# Patient Record
Sex: FEMALE | Employment: UNEMPLOYED | URBAN - METROPOLITAN AREA
[De-identification: names, ages, dates, MRNs, and addresses within clinical notes are randomized per-mention and may not be internally consistent; named-entity substitution may affect disease eponyms.]

---

## 2019-03-01 ASSESSMENT — MIFFLIN-ST. JEOR: SCORE: 1365.35

## 2019-03-05 ENCOUNTER — ANESTHESIA EVENT (OUTPATIENT)
Dept: SURGERY | Facility: AMBULATORY SURGERY CENTER | Age: 39
End: 2019-03-05

## 2019-03-06 ENCOUNTER — ANESTHESIA (OUTPATIENT)
Dept: SURGERY | Facility: AMBULATORY SURGERY CENTER | Age: 39
End: 2019-03-06

## 2019-03-06 ENCOUNTER — HOSPITAL ENCOUNTER (OUTPATIENT)
Facility: AMBULATORY SURGERY CENTER | Age: 39
Discharge: HOME | End: 2019-03-06
Attending: PLASTIC SURGERY | Admitting: PLASTIC SURGERY

## 2019-03-06 VITALS
TEMPERATURE: 97.6 F | OXYGEN SATURATION: 95 % | BODY MASS INDEX: 22.76 KG/M2 | SYSTOLIC BLOOD PRESSURE: 118 MMHG | HEIGHT: 67 IN | WEIGHT: 145 LBS | DIASTOLIC BLOOD PRESSURE: 81 MMHG | RESPIRATION RATE: 21 BRPM | HEART RATE: 66 BPM

## 2019-03-06 DIAGNOSIS — R11.0 NAUSEA AFTER ANESTHESIA, INITIAL ENCOUNTER: ICD-10-CM

## 2019-03-06 DIAGNOSIS — B99.9 INFECTION: ICD-10-CM

## 2019-03-06 DIAGNOSIS — T88.59XA NAUSEA AFTER ANESTHESIA, INITIAL ENCOUNTER: ICD-10-CM

## 2019-03-06 DIAGNOSIS — R52 PAIN: Primary | ICD-10-CM

## 2019-03-06 LAB — HCG UR QL: NEGATIVE

## 2019-03-06 PROCEDURE — 81025 URINE PREGNANCY TEST: CPT | Performed by: ANESTHESIOLOGY

## 2019-03-06 PROCEDURE — G8907 PT DOC NO EVENTS ON DISCHARG: HCPCS

## 2019-03-06 PROCEDURE — G8916 PT W IV AB GIVEN ON TIME: HCPCS

## 2019-03-06 PROCEDURE — L8600 IMPLANT BREAST SILICONE/EQ: HCPCS | Mod: 50

## 2019-03-06 PROCEDURE — 36000140 ZZH SURGERY LEVEL COSMETIC 120 MIN

## 2019-03-06 DEVICE — IMPLANTABLE DEVICE: Type: IMPLANTABLE DEVICE | Site: BREAST | Status: FUNCTIONAL

## 2019-03-06 RX ORDER — FENTANYL CITRATE 50 UG/ML
25-50 INJECTION, SOLUTION INTRAMUSCULAR; INTRAVENOUS
Status: DISCONTINUED | OUTPATIENT
Start: 2019-03-06 | End: 2019-03-07 | Stop reason: HOSPADM

## 2019-03-06 RX ORDER — DIAZEPAM 10 MG
10 TABLET ORAL EVERY 12 HOURS PRN
Status: DISCONTINUED
Start: 2019-03-06 | End: 2019-03-07 | Stop reason: HOSPADM

## 2019-03-06 RX ORDER — ONDANSETRON 2 MG/ML
INJECTION INTRAMUSCULAR; INTRAVENOUS PRN
Status: DISCONTINUED | OUTPATIENT
Start: 2019-03-06 | End: 2019-03-06

## 2019-03-06 RX ORDER — LIDOCAINE HYDROCHLORIDE 20 MG/ML
INJECTION, SOLUTION INFILTRATION; PERINEURAL PRN
Status: DISCONTINUED | OUTPATIENT
Start: 2019-03-06 | End: 2019-03-06

## 2019-03-06 RX ORDER — CEFAZOLIN SODIUM 2 G/100ML
2 INJECTION, SOLUTION INTRAVENOUS
Status: COMPLETED | OUTPATIENT
Start: 2019-03-06 | End: 2019-03-06

## 2019-03-06 RX ORDER — OXYCODONE AND ACETAMINOPHEN 5; 325 MG/1; MG/1
2 TABLET ORAL
Status: DISCONTINUED | OUTPATIENT
Start: 2019-03-06 | End: 2019-03-07 | Stop reason: HOSPADM

## 2019-03-06 RX ORDER — NALOXONE HYDROCHLORIDE 0.4 MG/ML
.1-.4 INJECTION, SOLUTION INTRAMUSCULAR; INTRAVENOUS; SUBCUTANEOUS
Status: DISCONTINUED | OUTPATIENT
Start: 2019-03-06 | End: 2019-03-07 | Stop reason: HOSPADM

## 2019-03-06 RX ORDER — ONDANSETRON 4 MG/1
8 TABLET, ORALLY DISINTEGRATING ORAL EVERY 8 HOURS PRN
Qty: 4 TABLET | Refills: 0 | Status: ON HOLD
Start: 2019-03-06 | End: 2020-07-04

## 2019-03-06 RX ORDER — HYDROMORPHONE HYDROCHLORIDE 1 MG/ML
.3-.5 INJECTION, SOLUTION INTRAMUSCULAR; INTRAVENOUS; SUBCUTANEOUS EVERY 10 MIN PRN
Status: DISCONTINUED | OUTPATIENT
Start: 2019-03-06 | End: 2019-03-07 | Stop reason: HOSPADM

## 2019-03-06 RX ORDER — GABAPENTIN 300 MG/1
300 CAPSULE ORAL ONCE
Status: COMPLETED | OUTPATIENT
Start: 2019-03-06 | End: 2019-03-06

## 2019-03-06 RX ORDER — TRIAMCINOLONE ACETONIDE 40 MG/ML
INJECTION, SUSPENSION INTRA-ARTICULAR; INTRAMUSCULAR PRN
Status: DISCONTINUED | OUTPATIENT
Start: 2019-03-06 | End: 2019-03-06 | Stop reason: HOSPADM

## 2019-03-06 RX ORDER — CHLORAL HYDRATE 500 MG
2 CAPSULE ORAL DAILY
COMMUNITY

## 2019-03-06 RX ORDER — PROPOFOL 10 MG/ML
INJECTION, EMULSION INTRAVENOUS PRN
Status: DISCONTINUED | OUTPATIENT
Start: 2019-03-06 | End: 2019-03-06

## 2019-03-06 RX ORDER — ALBUTEROL SULFATE 0.83 MG/ML
2.5 SOLUTION RESPIRATORY (INHALATION) EVERY 4 HOURS PRN
Status: DISCONTINUED | OUTPATIENT
Start: 2019-03-06 | End: 2019-03-07 | Stop reason: HOSPADM

## 2019-03-06 RX ORDER — MEPERIDINE HYDROCHLORIDE 25 MG/ML
12.5 INJECTION INTRAMUSCULAR; INTRAVENOUS; SUBCUTANEOUS
Status: DISCONTINUED | OUTPATIENT
Start: 2019-03-06 | End: 2019-03-07 | Stop reason: HOSPADM

## 2019-03-06 RX ORDER — ACETAMINOPHEN 325 MG/1
975 TABLET ORAL ONCE
Status: COMPLETED | OUTPATIENT
Start: 2019-03-06 | End: 2019-03-06

## 2019-03-06 RX ORDER — FENTANYL CITRATE 50 UG/ML
INJECTION, SOLUTION INTRAMUSCULAR; INTRAVENOUS PRN
Status: DISCONTINUED | OUTPATIENT
Start: 2019-03-06 | End: 2019-03-06

## 2019-03-06 RX ORDER — BUPIVACAINE HYDROCHLORIDE AND EPINEPHRINE 2.5; 5 MG/ML; UG/ML
INJECTION, SOLUTION INFILTRATION; PERINEURAL PRN
Status: DISCONTINUED | OUTPATIENT
Start: 2019-03-06 | End: 2019-03-06 | Stop reason: HOSPADM

## 2019-03-06 RX ORDER — MULTIPLE VITAMINS W/ MINERALS TAB 9MG-400MCG
1 TAB ORAL DAILY
Status: ON HOLD | COMMUNITY
End: 2020-07-04

## 2019-03-06 RX ORDER — CEPHALEXIN 500 MG/1
500 CAPSULE ORAL 2 TIMES DAILY
Qty: 28 CAPSULE | Refills: 0
Start: 2019-03-06 | End: 2019-03-13

## 2019-03-06 RX ORDER — DEXAMETHASONE SODIUM PHOSPHATE 4 MG/ML
10 INJECTION, SOLUTION INTRA-ARTICULAR; INTRALESIONAL; INTRAMUSCULAR; INTRAVENOUS; SOFT TISSUE ONCE
Status: COMPLETED | OUTPATIENT
Start: 2019-03-06 | End: 2019-03-06

## 2019-03-06 RX ORDER — ONDANSETRON 2 MG/ML
4 INJECTION INTRAMUSCULAR; INTRAVENOUS EVERY 30 MIN PRN
Status: DISCONTINUED | OUTPATIENT
Start: 2019-03-06 | End: 2019-03-07 | Stop reason: HOSPADM

## 2019-03-06 RX ORDER — NEOSTIGMINE METHYLSULFATE 1 MG/ML
VIAL (ML) INJECTION PRN
Status: DISCONTINUED | OUTPATIENT
Start: 2019-03-06 | End: 2019-03-06

## 2019-03-06 RX ORDER — ONDANSETRON 4 MG/1
4 TABLET, ORALLY DISINTEGRATING ORAL
Status: DISCONTINUED | OUTPATIENT
Start: 2019-03-06 | End: 2019-03-07 | Stop reason: HOSPADM

## 2019-03-06 RX ORDER — SODIUM CHLORIDE, SODIUM LACTATE, POTASSIUM CHLORIDE, CALCIUM CHLORIDE 600; 310; 30; 20 MG/100ML; MG/100ML; MG/100ML; MG/100ML
INJECTION, SOLUTION INTRAVENOUS CONTINUOUS
Status: DISCONTINUED | OUTPATIENT
Start: 2019-03-06 | End: 2019-03-07 | Stop reason: HOSPADM

## 2019-03-06 RX ORDER — GLYCOPYRROLATE 0.2 MG/ML
INJECTION, SOLUTION INTRAMUSCULAR; INTRAVENOUS PRN
Status: DISCONTINUED | OUTPATIENT
Start: 2019-03-06 | End: 2019-03-06

## 2019-03-06 RX ORDER — OXYCODONE AND ACETAMINOPHEN 5; 325 MG/1; MG/1
1-2 TABLET ORAL EVERY 4 HOURS PRN
Qty: 20 TABLET | Refills: 0 | Status: ON HOLD
Start: 2019-03-06 | End: 2020-07-04

## 2019-03-06 RX ORDER — PROPOFOL 10 MG/ML
INJECTION, EMULSION INTRAVENOUS CONTINUOUS PRN
Status: DISCONTINUED | OUTPATIENT
Start: 2019-03-06 | End: 2019-03-06

## 2019-03-06 RX ORDER — ONDANSETRON 4 MG/1
4 TABLET, ORALLY DISINTEGRATING ORAL EVERY 30 MIN PRN
Status: DISCONTINUED | OUTPATIENT
Start: 2019-03-06 | End: 2019-03-07 | Stop reason: HOSPADM

## 2019-03-06 RX ORDER — KETOROLAC TROMETHAMINE 30 MG/ML
30 INJECTION, SOLUTION INTRAMUSCULAR; INTRAVENOUS EVERY 6 HOURS PRN
Status: DISCONTINUED | OUTPATIENT
Start: 2019-03-06 | End: 2019-03-07 | Stop reason: HOSPADM

## 2019-03-06 RX ORDER — OXYCODONE HYDROCHLORIDE 5 MG/1
5-10 TABLET ORAL EVERY 4 HOURS PRN
Status: DISCONTINUED | OUTPATIENT
Start: 2019-03-06 | End: 2019-03-07 | Stop reason: HOSPADM

## 2019-03-06 RX ORDER — DEXAMETHASONE SODIUM PHOSPHATE 4 MG/ML
4 INJECTION, SOLUTION INTRA-ARTICULAR; INTRALESIONAL; INTRAMUSCULAR; INTRAVENOUS; SOFT TISSUE EVERY 10 MIN PRN
Status: DISCONTINUED | OUTPATIENT
Start: 2019-03-06 | End: 2019-03-07 | Stop reason: HOSPADM

## 2019-03-06 RX ORDER — HYDROXYZINE PAMOATE 25 MG/1
25 CAPSULE ORAL EVERY 6 HOURS PRN
Qty: 30 CAPSULE | Refills: 0 | Status: ON HOLD
Start: 2019-03-06 | End: 2020-07-04

## 2019-03-06 RX ORDER — HYDROXYZINE HYDROCHLORIDE 25 MG/1
25 TABLET, FILM COATED ORAL
Status: DISCONTINUED | OUTPATIENT
Start: 2019-03-06 | End: 2019-03-07 | Stop reason: HOSPADM

## 2019-03-06 RX ADMIN — FENTANYL CITRATE 25 MCG: 50 INJECTION, SOLUTION INTRAMUSCULAR; INTRAVENOUS at 11:00

## 2019-03-06 RX ADMIN — DEXAMETHASONE SODIUM PHOSPHATE 10 MG: 4 INJECTION, SOLUTION INTRA-ARTICULAR; INTRALESIONAL; INTRAMUSCULAR; INTRAVENOUS; SOFT TISSUE at 09:19

## 2019-03-06 RX ADMIN — Medication 3 MG: at 10:08

## 2019-03-06 RX ADMIN — PROPOFOL 200 MG: 10 INJECTION, EMULSION INTRAVENOUS at 09:12

## 2019-03-06 RX ADMIN — FENTANYL CITRATE 50 MCG: 50 INJECTION, SOLUTION INTRAMUSCULAR; INTRAVENOUS at 09:12

## 2019-03-06 RX ADMIN — Medication 20 MG: at 09:30

## 2019-03-06 RX ADMIN — FENTANYL CITRATE 50 MCG: 50 INJECTION, SOLUTION INTRAMUSCULAR; INTRAVENOUS at 09:24

## 2019-03-06 RX ADMIN — CEFAZOLIN SODIUM 2 G: 2 INJECTION, SOLUTION INTRAVENOUS at 09:08

## 2019-03-06 RX ADMIN — FENTANYL CITRATE 50 MCG: 50 INJECTION, SOLUTION INTRAMUSCULAR; INTRAVENOUS at 10:24

## 2019-03-06 RX ADMIN — ACETAMINOPHEN 975 MG: 325 TABLET ORAL at 08:28

## 2019-03-06 RX ADMIN — GABAPENTIN 300 MG: 300 CAPSULE ORAL at 08:28

## 2019-03-06 RX ADMIN — SODIUM CHLORIDE, SODIUM LACTATE, POTASSIUM CHLORIDE, CALCIUM CHLORIDE: 600; 310; 30; 20 INJECTION, SOLUTION INTRAVENOUS at 08:37

## 2019-03-06 RX ADMIN — GLYCOPYRROLATE 0.4 MG: 0.2 INJECTION, SOLUTION INTRAMUSCULAR; INTRAVENOUS at 10:08

## 2019-03-06 RX ADMIN — LIDOCAINE HYDROCHLORIDE 60 MG: 20 INJECTION, SOLUTION INFILTRATION; PERINEURAL at 09:12

## 2019-03-06 RX ADMIN — FENTANYL CITRATE 50 MCG: 50 INJECTION, SOLUTION INTRAMUSCULAR; INTRAVENOUS at 10:03

## 2019-03-06 RX ADMIN — Medication 20 MG: at 09:43

## 2019-03-06 RX ADMIN — PROPOFOL 150 MCG/KG/MIN: 10 INJECTION, EMULSION INTRAVENOUS at 09:13

## 2019-03-06 RX ADMIN — ONDANSETRON 4 MG: 2 INJECTION INTRAMUSCULAR; INTRAVENOUS at 09:59

## 2019-03-06 ASSESSMENT — LIFESTYLE VARIABLES: TOBACCO_USE: 1

## 2019-03-06 NOTE — ANESTHESIA PREPROCEDURE EVALUATION
"Anesthesia Pre-Procedure Evaluation    Patient: Zoe Nielsen   MRN:     [id number] Gender:   female   Age:    38 year old :      1980        Preoperative Diagnosis: Cosmetic   Procedure(s):  COSMETIC MAMMOPLASTY AUGMENTATION, Bilateral     History reviewed. No pertinent past medical history.   History reviewed. No pertinent surgical history.       Anesthesia Evaluation     .             ROS/MED HX    ENT/Pulmonary:  - neg pulmonary ROS   (+)tobacco use, , . .    Neurologic:  - neg neurologic ROS     Cardiovascular:  - neg cardiovascular ROS       METS/Exercise Tolerance:     Hematologic:  - neg hematologic  ROS       Musculoskeletal:  - neg musculoskeletal ROS       GI/Hepatic:  - neg GI/hepatic ROS       Renal/Genitourinary:  - ROS Renal section negative       Endo:  - neg endo ROS       Psychiatric:  - neg psychiatric ROS       Infectious Disease:  - neg infectious disease ROS       Malignancy:      - no malignancy   Other:    - neg other ROS                     PHYSICAL EXAM:   Mental Status/Neuro: A/A/O   Airway: Facies: Feasible  Mallampati: I  Mouth/Opening: Full  TM distance: > 6 cm  Neck ROM: Full   Respiratory: Auscultation: CTAB     Resp. Rate: Normal     Resp. Effort: Normal      CV: Rhythm: Regular  Rate: Age appropriate  Heart: Normal Sounds   Comments:      Dental: Normal                  Lab Results   Component Value Date    HCG Negative 2019       Preop Vitals  BP Readings from Last 3 Encounters:   19 118/81    Pulse Readings from Last 3 Encounters:   19 66      Resp Readings from Last 3 Encounters:   19 21    SpO2 Readings from Last 3 Encounters:   19 95%      Temp Readings from Last 1 Encounters:   19 36.4  C (97.6  F) (Tympanic)    Ht Readings from Last 1 Encounters:   19 1.702 m (5' 7\")      Wt Readings from Last 1 Encounters:   19 65.8 kg (145 lb)    Estimated body mass index is 22.71 kg/m  as calculated from the following:    Height as " "of this encounter: 1.702 m (5' 7\").    Weight as of this encounter: 65.8 kg (145 lb).     LDA:  ETT (adult) (Active)   Number of days: 0            Assessment:   ASA SCORE: 1       Documentation: H&P complete; Preop Testing complete; Consents complete   Proceeding: Proceed without further delay  Tobacco Use:  NO Active use of Tobacco/UNKNOWN Tobacco use status     Plan:   Anes. Type:  General   Pre-Induction: Midazolam IV; Acetaminophen PO   Induction:  IV (Standard)   Airway: LMA   Access/Monitoring: PIV   Maintenance: Balanced   Emergence: Procedure Site   Logistics: Same Day Surgery     Postop Pain/Sedation Strategy:  Standard-Options: Opioids PRN     PONV Management:  Adult Risk Factors: Female, Non-Smoker, Postop Opioids  Prevention: Ondansetron; Dexamethasone     CONSENT: Direct conversation   Plan and risks discussed with: Patient   Blood Products: Consent Deferred (Minimal Blood Loss)                         Salvador Hinkle MD  "

## 2019-03-06 NOTE — ANESTHESIA CARE TRANSFER NOTE
Patient: Zoe Nielsen    Procedure(s):  COSMETIC MAMMOPLASTY AUGMENTATION, Bilateral    Diagnosis: Cosmetic  Diagnosis Additional Information: No value filed.    Anesthesia Type:   General, LMA     Note:  Airway :Nasal Cannula  Patient transferred to:PACU  Comments: Prior to atraumatic LMA removal, patient awake, good aeration, SpO2 100%, equal bilateral breath sounds.  Transported to PACU on O2 3lpm.  Patient awake, alert, SpO2 98% in PACU No apparent anesthesia complications.         Vitals: (Last set prior to Anesthesia Care Transfer)    CRNA VITALS  3/6/2019 1019 - 3/6/2019 1053      3/6/2019             Resp Rate (observed):  11                Electronically Signed By: CHRISTIANO Brumfield CRNA  March 6, 2019  10:53 AM

## 2019-03-06 NOTE — DISCHARGE INSTRUCTIONS
[facility]  Same-Day Surgery   Adult Discharge Orders & Instructions   For 24 hours after surgery  1. Get plenty of rest.  A responsible adult must stay with you for at least 24 hours after you leave the hospital.   2. Do not drive or use heavy equipment.  If you have weakness or tingling, don't drive or use heavy equipment until this feeling goes away.  3. Do not drink alcohol.  4. Avoid strenuous or risky activities.  Ask for help when climbing stairs.   5. You may feel lightheaded.  IF so, sit for a few minutes before standing.  Have someone help you get up.   6. If you have nausea (feel sick to your stomach): Drink only clear liquids such as apple juice, ginger ale, broth or 7-Up.  Rest may also help.  Be sure to drink enough fluids.  Move to a regular diet as you feel able.  7. You may have a slight fever. Call the doctor if your fever is over 100 F (37.7 C) (taken under the tongue) or lasts longer than 24 hours.  8. You may have a dry mouth, a sore throat, muscle aches or trouble sleeping.  These should go away after 24 hours.  9. Do not make important or legal decisions.   Call your doctor for any of the followin.  Signs of infection (fever, growing tenderness at the surgery site, a large amount of drainage or bleeding, severe pain, foul-smelling drainage, redness, swelling).    2. It has been over 8 to 10 hours since surgery and you are still not able to urinate (pass water).    3.  Headache for over 24 hours.    Tylenol 975 mg at 0830

## 2019-03-06 NOTE — ANESTHESIA POSTPROCEDURE EVALUATION
Anesthesia POST Procedure Evaluation    Patient: Zoe Nielsen   MRN:     [id number] Gender:   female   Age:    38 year old :      1980        Preoperative Diagnosis: Cosmetic   Procedure(s):  COSMETIC MAMMOPLASTY AUGMENTATION, Bilateral   Postop Comments: No value filed.       Anesthesia Type:  General    Reportable Event: NO     PAIN: Uncomplicated   Sign Out status: Comfortable, Well controlled pain     PONV: No PONV   Sign Out status:  No Nausea or Vomiting     Neuro/Psych: Uneventful perioperative course   Sign Out Status: Preoperative baseline; Age appropriate mentation     Airway/Resp.: Uneventful perioperative course   Sign Out Status: Airway Device present     CV: Uneventful perioperative course   Sign Out status: Appropriate BP and perfusion indices; Appropriate HR/Rhythm     Disposition:   Sign Out in:  PACU  Disposition:  Phase II; Home  Recovery Course: Uneventful  Follow-Up: Not required           Last Anesthesia Record Vitals:  CRNA VITALS  3/6/2019 1019 - 3/6/2019 1119      3/6/2019             Resp Rate (observed):  11          Last PACU/Preop Vitals:  Vitals:    19 1115 19 1130 19 1145   BP: 118/79 118/81    Pulse: 62 66    Resp: 13 9 21   Temp:      SpO2: 96% 97% 95%         Electronically Signed By: Salvador Hinkle MD, 2019, 2:31 PM

## 2019-03-06 NOTE — BRIEF OP NOTE
[facility] Brief Operative Note    Pre-operative diagnosis: hypomastia   Post-operative diagnosis same   Procedure: Procedure(s):  COSMETIC MAMMOPLASTY AUGMENTATION, Bilateral   Surgeon(s): Surgeon(s) and Role:     * Gerardo Ford MD - Primary   Estimated blood loss: 10 mL    Specimens: none   Findings: none   Assist: none  Complications: none  Condition: extubated and stable to PAR    Gerardo Ford MD

## 2019-03-18 NOTE — OP NOTE
Procedure Date: 03/06/2019      PREOPERATIVE DIAGNOSES:  Postpartum involution and hypomastia.      POSTOPERATIVE DIAGNOSES:  Postpartum involution and hypomastia.      PROCEDURE PERFORMED:  Bilateral submuscular augmentation mammoplasty through an inframammary fold incision utilizing Sientra, smooth, round, high-profile, HSC, 470 mL cohesive gel breast implants.      IMPLANTS:   1.  Left breast:  Sientra, 470 mL, HSC, smooth, round, high-profile, cohesive gel breast implant, reference number [id number], serial number is [id number].     2.  Right breast:  Sientra, 470 mL, HSC, smooth, round, high-profile, cohesive gel breast implant, reference number [id number], serial number [id number].      ANESTHETIC:  General utilizing a laryngeal mask airway.      INDICATIONS:  The patient is a very pleasant and attractive 38-year-old female who wishes to have larger breasts.  The patient understands that breast implants are not considered a lifetime medical device and she will likely have to have them replaced within her lifetime.  I recommend MRI scanning and surveillance for breast implant failure 9-10 years postoperatively.  The current generation of the Sientra gel breast implants do carry a lifetime warranty.  The patient understands there is a phenomenon of capsular contracture and the body may form an aggressive scar capsule around the breast implant.  In my practice, rate of capsular contracture is between 2% to 4% when an implant is placed in a submuscular position through an inframammary fold incision as will be the case for this patient.  Other maneuvers I utilize to diminish the chance of capsule contracture include the use of breast pocket irrigations consisting of Ancef, gentamicin and bacitracin.  Use of these breast pocket irrigations were shown to decrease the capsular contracture in clinical studies performed by Anjum Vidales MD, [facility].  For the last 6-7 years, I have been using an  Im-Plant funnel or a Dowd funnel for insertion of the breast implant which allows for a no-touch technique when placing the implant, and this is thought to decrease the chance of developing a biofilm and therefore capsular contracture.  Since Edgar Tuttle MD, from [facility] described a phenomenon known as a biofilm, all 3 breast implant manufacturers have instituted a capsular contracture warranty.  The [employer] breast implant company offers 2 years of financial assistance should the patient develop capsular contracture within the first 2 years of implantation.      The patient understands that she should tell her mammographer she has breast implants each year when she has her screening mammogram.  The patient is 39 years of age, and I recommend yearly screening mammograms.  Special mammographic views known as Emily are used to best visualize the breasts following augmentation mammoplasty.  The patient understands that her incision will be in the inframammary fold.  The patient understands that her implant will be placed in a submuscular position.  The patient understands the risks of the operation include a chance of capsular contractures, bleeding complication or sequelae of breast augmentation followed in my practice by implant malposition, hypertrophic scarring, and possible dissatisfaction with cosmetic outcome.  Other complications such as bleeding, infection, hematoma and seroma have occurred at a rate of under 1 in 2500 cases in my practice.  There is a very slight chance of diminished sensation in the nipple-areolar complex.  This runs far under 1% in my practice.  The patient was told there is a condition known as breast implant-associated lymphoma.  All cases of breast implant-associated lymphoma reported worldwide have occurred where the surgeon chose to use textured breast implants.  I use only smooth breast implants and have since starting my career in [place] at the [facility]  [place] in 1998.  No cases of breast implant-associated lymphoma have occurred with the use of smooth implants.  The patient was given a chance to ask questions and all were answered.  The patient provides informed consent for the procedure using our in-office consent form as well as the morning of surgery at [facility].      DESCRIPTION OF PROCEDURE:  In the preoperative holding area, the inframammary fold was marked and consent was obtained.  The patient was then taken to the operating room and placed in supine position on the operating table.  A successful general endotracheal anesthetic was then induced.  The patient received 2 grams of Ancef and 10 mg of Decadron intravenously prior to commencing with surgery.  The patient's chest was then prepped and draped in routine sterile fashion.  I then covered the nipple-areolar complexes with 3M Tegaderm dressings.  A 4 cm incision was made in the right inframammary fold and through this incision, dissection was carried down to the pectoralis major muscle.  I dissected several centimeters above the inframammary fold onto the clavipectoral fascia.  Then over a rib utilizing a Areli retractor, I established a submuscular plane.  The muscle was partially released from 3 o'clock to 6 o'clock on the right side.  Intercostal perforating vessels were cauterized using an insulated DeBakey-type monopolar forceps as I came upon them.  Finger dissection was used laterally to avoid any traction of the fourth intercostal nerve.  Superiorly, dissection was made between the pectoralis major and pectoralis minor muscles using a blunt uterine sound.  The 470 mL sizer was placed and gave nice shape to her breast.  This also identified areas where further attenuation of the medial fibers of the pectoralis major was needed.  This was accomplished surgically.  The right breast pocket was then irrigated with solution consisting of 1 gram of Ancef, 80 mg of  gentamicin, and bacitracin in normal saline.  This was done in accordance with studies by Anjum Vidales MD, from [facility], as a clinically proven means of diminishing the chance of capsule contracture.  The dissection pocket was then injected with 0.25% Marcaine with Kenalog to diminish postoperative discomfort and for the anti-inflammatory properties of the steroids.      Attention was then directed to the left side where an identical operation was performed.  On the left side, the muscle was partially released from 3 o'clock to 6 o'clock.  Again, a 470 cc sizer was placed and filled, which gave nice shape to her breast.  This also identified areas where further attenuation of the medial fibers of the pectoralis major was needed and this was accomplished surgically.  The Ancef, gentamicin, and bacitracin solution was then irrigated into the left breast pocket.  I then injected the left breast pocket with Marcaine and Kenalog solution to diminish postoperative discomfort and for the anti-inflammatory properties of the steroids.      Sientra, 470 mL, HP, smooth, round, high-profile, high-strength cohesive gel breast implants were chosen.  Their reference numbers and serial numbers can be found at the beginning of this operative report.  The implants were opened on the backtable and soaked in the antibiotic solution.  At this point, all operative personnel changed their gloves.  An Im-Plant funnel was opened and the lubricant was dispersed.  The Im-Plant funnel was also supplemented with the Ancef, gentamicin, and bacitracin solution.  The implant was emptied from its sterile container into the Im-Plant funnel and the zipper closed. The Im-Plant funnel was trimmed to the appropriate size for a 470 mL implant.  The implant was then inserted utilizing the Im-Plant funnel and a no-touch technique.  Closure consisted of 3-0 Vicryl sutures in the deep muscular layer.  The deep subcutaneous layer was  closed using 4-0 PDS sutures in buried, interrupted fashion.  Deep dermal sutures were placed using 4-0 Monocryl suture in buried and interrupted fashion.  The skin was then closed using 4-0 Prolene suture in running and buried continuous intracuticular fashion.      Attention was then directed to the left side.  Once again, all operative personnel changed their gloves.  The implant was soaking in the Ancef, gentamicin, and bacitracin solution.  The implant was emptied from its sterile container into the Im-Plant funnel.  Zipper was closed and the implant was inserted into the breast pocket utilizing a no-touch technique.  Closure was identical from left to right.  Dressing consisted of Mastisol, 1/2-inch Steri-Strips, and a surgical bra.      ESTIMATED BLOOD LOSS:  Minimal.      COMPLICATIONS:  None.      CONDITION:  Stable to postanesthesia recovery.      OPERATIVE TIMES:  Total scheduled operating room time 2 hours 30 minutes equals 2 hours of paid surgical time plus 30 minutes of anesthesia/OR time.  Actual total operating room time 1 hour 42 minutes equals 1 hour 18 minutes of actual surgical operative time plus 24 minutes of anesthesia/OR time.      ROOM TIMES:   In operating room time 9:07 a.m.  Start time of procedure 9:26 a.m.  End time for procedure 10:44 a.m.   Out of room time 10:49 a.m.         GORDY TYSON MD             D: 2019   T: 2019   MT: ESTELLE      Name:     RUBENS ARMAS   MRN:      [id number]        Account:        [id number]   :      1980           Procedure Date: 2019      Document: [id number]

## 2020-07-04 ENCOUNTER — HOSPITAL ENCOUNTER (EMERGENCY)
Facility: CLINIC | Age: 40
End: 2020-07-04

## 2020-07-04 ENCOUNTER — APPOINTMENT (OUTPATIENT)
Dept: ULTRASOUND IMAGING | Facility: CLINIC | Age: 40
End: 2020-07-04
Attending: OBSTETRICS & GYNECOLOGY

## 2020-07-04 ENCOUNTER — HOSPITAL ENCOUNTER (OUTPATIENT)
Facility: CLINIC | Age: 40
Discharge: HOME | End: 2020-07-04
Attending: OBSTETRICS & GYNECOLOGY | Admitting: OBSTETRICS & GYNECOLOGY

## 2020-07-04 ENCOUNTER — HOSPITAL ENCOUNTER (OUTPATIENT)
Facility: CLINIC | Age: 40
End: 2020-07-04
Admitting: OBSTETRICS & GYNECOLOGY

## 2020-07-04 ENCOUNTER — HOSPITAL ENCOUNTER (EMERGENCY)
Facility: CLINIC | Age: 40
Discharge: HOME | End: 2020-07-04
Attending: EMERGENCY MEDICINE | Admitting: EMERGENCY MEDICINE

## 2020-07-04 VITALS — DIASTOLIC BLOOD PRESSURE: 81 MMHG | TEMPERATURE: 97.9 F | RESPIRATION RATE: 16 BRPM | SYSTOLIC BLOOD PRESSURE: 136 MMHG

## 2020-07-04 VITALS
TEMPERATURE: 97.7 F | SYSTOLIC BLOOD PRESSURE: 103 MMHG | RESPIRATION RATE: 16 BRPM | DIASTOLIC BLOOD PRESSURE: 63 MMHG | WEIGHT: 145 LBS | BODY MASS INDEX: 22.76 KG/M2 | OXYGEN SATURATION: 99 % | HEART RATE: 73 BPM | HEIGHT: 67 IN

## 2020-07-04 DIAGNOSIS — E86.0 DEHYDRATION: ICD-10-CM

## 2020-07-04 DIAGNOSIS — R10.31 ABDOMINAL PAIN, RIGHT LOWER QUADRANT: ICD-10-CM

## 2020-07-04 PROBLEM — R82.5 POSITIVE URINE DRUG SCREEN: Status: ACTIVE | Noted: 2020-07-04

## 2020-07-04 PROBLEM — Z34.80 SUPERVISION OF OTHER NORMAL PREGNANCY: Status: ACTIVE | Noted: 2020-07-04

## 2020-07-04 PROBLEM — R10.9 ABDOMINAL PAIN DURING PREGNANCY IN SECOND TRIMESTER: Status: ACTIVE | Noted: 2020-07-04

## 2020-07-04 PROBLEM — O26.892 ABDOMINAL PAIN DURING PREGNANCY IN SECOND TRIMESTER: Status: ACTIVE | Noted: 2020-07-04

## 2020-07-04 LAB
ABO + RH BLD: NORMAL
ABO + RH BLD: NORMAL
ALBUMIN UR-MCNC: NEGATIVE MG/DL
ALT SERPL W P-5'-P-CCNC: 14 U/L (ref 0–50)
AMORPH CRY #/AREA URNS HPF: ABNORMAL /HPF
AMPHETAMINES UR QL SCN: POSITIVE
AMYLASE SERPL-CCNC: 41 U/L (ref 30–110)
ANION GAP SERPL CALCULATED.3IONS-SCNC: 7 MMOL/L (ref 3–14)
APPEARANCE UR: ABNORMAL
APTT PPP: 26 SEC (ref 22–37)
AST SERPL W P-5'-P-CCNC: 11 U/L (ref 0–45)
BACTERIA #/AREA URNS HPF: ABNORMAL /HPF
BARBITURATES UR QL: NEGATIVE
BASOPHILS # BLD AUTO: 0 10E9/L (ref 0–0.2)
BASOPHILS NFR BLD AUTO: 0.4 %
BENZODIAZ UR QL: NEGATIVE
BILIRUB UR QL STRIP: NEGATIVE
BLD GP AB SCN SERPL QL: NORMAL
BLOOD BANK CMNT PATIENT-IMP: NORMAL
BUN SERPL-MCNC: 12 MG/DL (ref 7–30)
CALCIUM SERPL-MCNC: 8.6 MG/DL (ref 8.5–10.1)
CANNABINOIDS UR QL SCN: POSITIVE
CHLORIDE SERPL-SCNC: 105 MMOL/L (ref 94–109)
CO2 SERPL-SCNC: 24 MMOL/L (ref 20–32)
COCAINE UR QL: NEGATIVE
COLOR UR AUTO: YELLOW
CREAT SERPL-MCNC: 0.55 MG/DL (ref 0.52–1.04)
DIFFERENTIAL METHOD BLD: NORMAL
EOSINOPHIL # BLD AUTO: 0 10E9/L (ref 0–0.7)
EOSINOPHIL NFR BLD AUTO: 0.3 %
ERYTHROCYTE [DISTWIDTH] IN BLOOD BY AUTOMATED COUNT: 13.7 % (ref 10–15)
FIBRINOGEN PPP-MCNC: 456 MG/DL (ref 200–420)
GFR SERPL CREATININE-BSD FRML MDRD: >90 ML/MIN/{1.73_M2}
GLUCOSE SERPL-MCNC: 109 MG/DL (ref 70–99)
GLUCOSE UR STRIP-MCNC: NEGATIVE MG/DL
HCT VFR BLD AUTO: 32.6 % (ref 35–47)
HGB BLD-MCNC: 11 G/DL (ref 11.7–15.7)
HGB UR QL STRIP: NEGATIVE
IMM GRANULOCYTES # BLD: 0.1 10E9/L (ref 0–0.4)
IMM GRANULOCYTES NFR BLD: 0.5 %
INR PPP: 1.05 (ref 0.86–1.14)
KETONES UR STRIP-MCNC: 80 MG/DL
LEUKOCYTE ESTERASE UR QL STRIP: NEGATIVE
LIPASE SERPL-CCNC: 63 U/L (ref 73–393)
LYMPHOCYTES # BLD AUTO: 2.1 10E9/L (ref 0.8–5.3)
LYMPHOCYTES NFR BLD AUTO: 20 %
MCH RBC QN AUTO: 29.5 PG (ref 26.5–33)
MCHC RBC AUTO-ENTMCNC: 33.7 G/DL (ref 31.5–36.5)
MCV RBC AUTO: 87 FL (ref 78–100)
MONOCYTES # BLD AUTO: 0.6 10E9/L (ref 0–1.3)
MONOCYTES NFR BLD AUTO: 5.9 %
NEUTROPHILS # BLD AUTO: 7.7 10E9/L (ref 1.6–8.3)
NEUTROPHILS NFR BLD AUTO: 72.9 %
NITRATE UR QL: NEGATIVE
NRBC # BLD AUTO: 0 10*3/UL
NRBC BLD AUTO-RTO: 0 /100
OPIATES UR QL SCN: POSITIVE
PCP UR QL SCN: NEGATIVE
PH UR STRIP: 7 PH (ref 5–7)
PLATELET # BLD AUTO: 233 10E9/L (ref 150–450)
POTASSIUM SERPL-SCNC: 3.1 MMOL/L (ref 3.4–5.3)
RBC # BLD AUTO: 3.73 10E12/L (ref 3.8–5.2)
RBC #/AREA URNS AUTO: 0 /HPF (ref 0–2)
SODIUM SERPL-SCNC: 136 MMOL/L (ref 133–144)
SOURCE: ABNORMAL
SP GR UR STRIP: 1.02 (ref 1–1.03)
SPECIMEN EXP DATE BLD: NORMAL
SQUAMOUS #/AREA URNS AUTO: 5 /HPF (ref 0–1)
URATE SERPL-MCNC: 3.1 MG/DL (ref 2.6–6)
UROBILINOGEN UR STRIP-MCNC: 0 MG/DL (ref 0–2)
WBC # BLD AUTO: 10.5 10E9/L (ref 4–11)
WBC # BLD AUTO: 10.6 10E9/L (ref 4–11)
WBC #/AREA URNS AUTO: 3 /HPF (ref 0–5)

## 2020-07-04 PROCEDURE — 87086 URINE CULTURE/COLONY COUNT: CPT | Performed by: EMERGENCY MEDICINE

## 2020-07-04 PROCEDURE — 85610 PROTHROMBIN TIME: CPT | Performed by: OBSTETRICS & GYNECOLOGY

## 2020-07-04 PROCEDURE — 86850 RBC ANTIBODY SCREEN: CPT | Performed by: OBSTETRICS & GYNECOLOGY

## 2020-07-04 PROCEDURE — 25800030 ZZH RX IP 258 OP 636: Performed by: OBSTETRICS & GYNECOLOGY

## 2020-07-04 PROCEDURE — 84460 ALANINE AMINO (ALT) (SGPT): CPT | Performed by: OBSTETRICS & GYNECOLOGY

## 2020-07-04 PROCEDURE — G0463 HOSPITAL OUTPT CLINIC VISIT: HCPCS | Mod: 25

## 2020-07-04 PROCEDURE — 36415 COLL VENOUS BLD VENIPUNCTURE: CPT | Performed by: OBSTETRICS & GYNECOLOGY

## 2020-07-04 PROCEDURE — 96361 HYDRATE IV INFUSION ADD-ON: CPT

## 2020-07-04 PROCEDURE — 85384 FIBRINOGEN ACTIVITY: CPT | Performed by: OBSTETRICS & GYNECOLOGY

## 2020-07-04 PROCEDURE — 99283 EMERGENCY DEPT VISIT LOW MDM: CPT | Performed by: EMERGENCY MEDICINE

## 2020-07-04 PROCEDURE — 83690 ASSAY OF LIPASE: CPT | Performed by: OBSTETRICS & GYNECOLOGY

## 2020-07-04 PROCEDURE — 96360 HYDRATION IV INFUSION INIT: CPT

## 2020-07-04 PROCEDURE — 85025 COMPLETE CBC W/AUTO DIFF WBC: CPT | Performed by: OBSTETRICS & GYNECOLOGY

## 2020-07-04 PROCEDURE — 25000128 H RX IP 250 OP 636: Performed by: OBSTETRICS & GYNECOLOGY

## 2020-07-04 PROCEDURE — 84550 ASSAY OF BLOOD/URIC ACID: CPT | Performed by: OBSTETRICS & GYNECOLOGY

## 2020-07-04 PROCEDURE — 96374 THER/PROPH/DIAG INJ IV PUSH: CPT

## 2020-07-04 PROCEDURE — 84450 TRANSFERASE (AST) (SGOT): CPT | Performed by: OBSTETRICS & GYNECOLOGY

## 2020-07-04 PROCEDURE — 85048 AUTOMATED LEUKOCYTE COUNT: CPT | Performed by: OBSTETRICS & GYNECOLOGY

## 2020-07-04 PROCEDURE — 76705 ECHO EXAM OF ABDOMEN: CPT

## 2020-07-04 PROCEDURE — 80307 DRUG TEST PRSMV CHEM ANLYZR: CPT | Performed by: OBSTETRICS & GYNECOLOGY

## 2020-07-04 PROCEDURE — 86900 BLOOD TYPING SEROLOGIC ABO: CPT | Performed by: OBSTETRICS & GYNECOLOGY

## 2020-07-04 PROCEDURE — 82150 ASSAY OF AMYLASE: CPT | Performed by: OBSTETRICS & GYNECOLOGY

## 2020-07-04 PROCEDURE — 85004 AUTOMATED DIFF WBC COUNT: CPT | Performed by: OBSTETRICS & GYNECOLOGY

## 2020-07-04 PROCEDURE — 76805 OB US >/= 14 WKS SNGL FETUS: CPT

## 2020-07-04 PROCEDURE — 96375 TX/PRO/DX INJ NEW DRUG ADDON: CPT

## 2020-07-04 PROCEDURE — 80048 BASIC METABOLIC PNL TOTAL CA: CPT | Performed by: OBSTETRICS & GYNECOLOGY

## 2020-07-04 PROCEDURE — 81001 URINALYSIS AUTO W/SCOPE: CPT | Performed by: OBSTETRICS & GYNECOLOGY

## 2020-07-04 PROCEDURE — 85730 THROMBOPLASTIN TIME PARTIAL: CPT | Performed by: OBSTETRICS & GYNECOLOGY

## 2020-07-04 PROCEDURE — 40000809 ZZH STATISTIC NO DOCUMENTATION TO SUPPORT CHARGE

## 2020-07-04 PROCEDURE — 85027 COMPLETE CBC AUTOMATED: CPT | Performed by: OBSTETRICS & GYNECOLOGY

## 2020-07-04 PROCEDURE — 86901 BLOOD TYPING SEROLOGIC RH(D): CPT | Performed by: OBSTETRICS & GYNECOLOGY

## 2020-07-04 PROCEDURE — 99284 EMERGENCY DEPT VISIT MOD MDM: CPT | Mod: Z6 | Performed by: EMERGENCY MEDICINE

## 2020-07-04 RX ORDER — HYDROMORPHONE HYDROCHLORIDE 1 MG/ML
0.5 INJECTION, SOLUTION INTRAMUSCULAR; INTRAVENOUS; SUBCUTANEOUS ONCE
Status: COMPLETED | OUTPATIENT
Start: 2020-07-04 | End: 2020-07-04

## 2020-07-04 RX ORDER — SODIUM CHLORIDE, SODIUM LACTATE, POTASSIUM CHLORIDE, CALCIUM CHLORIDE 600; 310; 30; 20 MG/100ML; MG/100ML; MG/100ML; MG/100ML
INJECTION, SOLUTION INTRAVENOUS CONTINUOUS
Status: DISCONTINUED | OUTPATIENT
Start: 2020-07-04 | End: 2020-07-04 | Stop reason: HOSPADM

## 2020-07-04 RX ORDER — HYDROMORPHONE HYDROCHLORIDE 1 MG/ML
0.5 INJECTION, SOLUTION INTRAMUSCULAR; INTRAVENOUS; SUBCUTANEOUS
Status: DISCONTINUED | OUTPATIENT
Start: 2020-07-04 | End: 2020-07-04 | Stop reason: HOSPADM

## 2020-07-04 RX ORDER — NALOXONE HYDROCHLORIDE 0.4 MG/ML
.1-.4 INJECTION, SOLUTION INTRAMUSCULAR; INTRAVENOUS; SUBCUTANEOUS
Status: DISCONTINUED | OUTPATIENT
Start: 2020-07-04 | End: 2020-07-04 | Stop reason: HOSPADM

## 2020-07-04 RX ORDER — ONDANSETRON 2 MG/ML
4 INJECTION INTRAMUSCULAR; INTRAVENOUS EVERY 6 HOURS PRN
Status: DISCONTINUED | OUTPATIENT
Start: 2020-07-04 | End: 2020-07-04 | Stop reason: HOSPADM

## 2020-07-04 RX ADMIN — HYDROMORPHONE HYDROCHLORIDE 0.5 MG: 1 INJECTION, SOLUTION INTRAMUSCULAR; INTRAVENOUS; SUBCUTANEOUS at 20:02

## 2020-07-04 RX ADMIN — SODIUM CHLORIDE, POTASSIUM CHLORIDE, SODIUM LACTATE AND CALCIUM CHLORIDE 1000 ML: 600; 310; 30; 20 INJECTION, SOLUTION INTRAVENOUS at 20:01

## 2020-07-04 RX ADMIN — HYDROMORPHONE HYDROCHLORIDE 0.5 MG: 1 INJECTION, SOLUTION INTRAMUSCULAR; INTRAVENOUS; SUBCUTANEOUS at 19:09

## 2020-07-04 RX ADMIN — ONDANSETRON 4 MG: 2 INJECTION INTRAMUSCULAR; INTRAVENOUS at 19:11

## 2020-07-04 ASSESSMENT — MIFFLIN-ST. JEOR: SCORE: 1355.35

## 2020-07-04 NOTE — ED AVS SNAPSHOT
[facility] Emergency Department  [address] [address]  Phone:  [phone]  Fax:  [phone]                                    Zoe Nielsen   MRN: [id number]    Department:  [facility] Emergency Department   Date of Visit:  7/4/2020           After Visit Summary Signature Page    I have received my discharge instructions, and my questions have been answered. I have discussed any challenges I see with this plan with the nurse or doctor.    ..........................................................................................................................................  Patient/Patient Representative Signature      ..........................................................................................................................................  Patient Representative Print Name and Relationship to Patient    ..................................................               ................................................  Date                                   Time    ..........................................................................................................................................  Reviewed by Signature/Title    ...................................................              ..............................................  Date                                               Time          22EPIC Rev 08/18

## 2020-07-05 NOTE — DISCHARGE INSTRUCTIONS
Drink plenty of fluids, rest    Avoid drug use during pregnancy    Follow-up for recurrent worsening pain, take Tylenol/ibuprofen for discomfort

## 2020-07-05 NOTE — ED TRIAGE NOTES
19w pregnant  Third pregnancy, other two were full term births. Two live children aged 20years and 4 years  Severe abdominal pain, cramping, feels like labor  Fetal, appendix, and right kidney ultrasound normal  0.5mg dilaudid x2 with some relief after second dose  Cervix closed and long  No bleeding, no leaking  UA and bloodwork completed PTA

## 2020-07-05 NOTE — PROGRESS NOTES
S: Discharge from triage to ED  A: A:minimal variability, moderate variablility, no accels, no decels, appropriate for gestational age.  no uterine activity    not examined  Admission on 07/04/2020   Component Date Value     INR 07/04/2020 1.05      PTT 07/04/2020 26      Fibrinogen 07/04/2020 456*     ABO 07/04/2020 PENDING      Antibody Screen 07/04/2020 PENDING      Test Valid Only At 07/04/2020 PENDING      Specimen Expires 07/04/2020 07/07/2020      WBC 07/04/2020 10.6      RBC Count 07/04/2020 3.73*     Hemoglobin 07/04/2020 11.0*     Hematocrit 07/04/2020 32.6*     MCV 07/04/2020 87      MCH 07/04/2020 29.5      MCHC 07/04/2020 33.7      RDW 07/04/2020 13.7      Platelet Count 07/04/2020 233      AST 07/04/2020 11      ALT 07/04/2020 14      Sodium 07/04/2020 136      Potassium 07/04/2020 3.1*     Chloride 07/04/2020 105      Carbon Dioxide 07/04/2020 24      Anion Gap 07/04/2020 7      Glucose 07/04/2020 109*     Urea Nitrogen 07/04/2020 12      Creatinine 07/04/2020 0.55      GFR Estimate 07/04/2020 >90      GFR Estimate If Black 07/04/2020 >90      Calcium 07/04/2020 8.6      Uric Acid 07/04/2020 3.1      Amylase 07/04/2020 41      Lipase 07/04/2020 63*     WBC 07/04/2020 10.5      Diff Method 07/04/2020 Automated Method      % Neutrophils 07/04/2020 72.9      % Lymphocytes 07/04/2020 20.0      % Monocytes 07/04/2020 5.9      % Eosinophils 07/04/2020 0.3      % Basophils 07/04/2020 0.4      % Immature Granulocytes 07/04/2020 0.5      Nucleated RBCs 07/04/2020 0      Absolute Neutrophil 07/04/2020 7.7      Absolute Lymphocytes 07/04/2020 2.1      Absolute Monocytes 07/04/2020 0.6      Absolute Eosinophils 07/04/2020 0.0      Absolute Basophils 07/04/2020 0.0      Abs Immature Granulocytes 07/04/2020 0.1      Absolute Nucleated RBC 07/04/2020 0.0      Dr. BYRON Cabral informed of above and discharge order received to ED  R: Plan includes: IV stopped at 2030 Patient instructed to report any recurrence of above  concerns to her primary care provider during clinic hours or [facility] at any other time. Patient verbalized understanding of After Visit Summary, education and agreement with plan. Agrees to call for any problems, questions or concerns.  To ED undelivered via wheelchair  in stable condition with all belongings. Accompanied by RN .Report given to ED RN

## 2020-07-05 NOTE — PROGRESS NOTES
Pt arrived from ED with c/o severe abdominal pain since approximately 1700. [facility] unsure [facility] date and has not established PNC due to covid 19 and denial of pregnancy. Plans to deliver and doctor at [facility]. Mother present and supportive. Oriented to room and procedures. EFM/EUM VSS, afebrile. Unable to palpate contractions and none seen on EUM. Pt denies contractions, bleeding or LOF. Also has not felt baby move at all, ever. States she had a large normal BM this morning. No vomiting but is now nauseated. Rates pain 10/10, all in her belly, none in her back. Dr Cabral called and orders obtained. IV placed right wrist and bolus started. Labs drawn. US at bedside now. Plan to give Dilaudid 0.5 mg IV and Zofran. Dr Cabral en route. Report to JUANI Davis RN.

## 2020-07-05 NOTE — ED PROVIDER NOTES
History     Chief Complaint   Patient presents with     Abdominal Pain     HPI  Zoe Nielsen is a 40 year old female who presents from OB clinic, she is 19 weeks pregnant -0-0-2.  [place] developed abdominal pain described as sharp radiating across the low abdomen but also right flank and right upper quadrant, symptoms wax and wane, no associated nausea or.  No prior such discomfort.  Describes pain as severe.  Denies urinary symptoms, last bowel movement was today.  No prior abdominal surgery.  No current medications.  Follows currently with a midwife, lives in [place].  Smokes cigarettes infrequently, denies alcohol, continues to use marijuana.  Denies other illicit substance use.  Seen upstairs in OB, she has not in labor, work-up accomplished in essentially unremarkable with exception of urine tox screen just positive for amphetamine and THC, presumably opiate positive secondary to 2 doses hydromorphone.  Currently awaiting urinalysis.  Describes pain as mild/moderate at this point in time without exacerbating or alleviating factor.  Denies chest pain shortness of air cough or fever.    Allergies:  No Known Allergies    Problem List:    Patient Active Problem List    Diagnosis Date Noted     Supervision of other normal pregnancy 2020     Priority: Medium     Positive urine drug screen 2020     Priority: Medium     Abdominal pain during pregnancy in second trimester 2020     Priority: Medium        Past Medical History:    Past Medical History:   Diagnosis Date     Tooth fracture        Past Surgical History:    Past Surgical History:   Procedure Laterality Date     COSMETIC MAMMOPLASTY AUGMENTATION Bilateral 3/6/2019    Procedure: COSMETIC MAMMOPLASTY AUGMENTATION, Bilateral;  Surgeon: Gerardo Ford MD;  Location: [facility] OR       Family History:    No family history on file.    Social History:  Marital Status:  Single [1]  Social  "History     Tobacco Use     Smoking status: Light Tobacco Smoker     Types: Cigarettes   Substance Use Topics     Alcohol use: Not Currently     Drug use: Not Currently     Types: Marijuana        Medications:    fish oil-omega-3 fatty acids 1000 MG capsule          Review of Systems  All other systems reviewed and are negative.    Physical Exam   BP: 122/80  Pulse: 71  Temp: 97.7  F (36.5  C)  Resp: 16  Height: 170.2 cm (5' 7\")  Weight: 65.8 kg (145 lb)  SpO2: 100 %      Physical Exam  Nontoxic appearing no respiratory distress alert and oriented ×3  Head atraumatic normocephalic  No cervical adenopathy   Neck supple full active painless range of motion  Lungs clear to auscultation  Heart regular no murmur  Abdomen soft mild/moderate right mid abdominal tenderness right flank tenderness without redness or rash, gravid consistent with dates, bowel sounds positive   Strength and sensation grossly intact throughout the extremities, gait and station normal  Speech is fluent, good eye contact, thought processes are rational      ED Course        Procedures               Critical Care time:  none     Results for orders placed or performed during the hospital encounter of 07/04/20   Urine Culture Aerobic Bacterial     Status: None (Preliminary result)    Specimen: Midstream Urine   Result Value Ref Range    Specimen Description Midstream Urine     Special Requests Specimen received in preservative     Culture Micro PENDING    Results for orders placed or performed during the hospital encounter of 07/04/20    OB > 14 Weeks     Status: None    Narrative     OB > 14 WEEKS 7/4/2020 7:52 PM    CLINICAL HISTORY: Abdominal pain during early pregnancy.    TECHNIQUE: Routine.    COMPARISON: None.    FINDINGS:     Presentation: Breech.  Cardiac activity: 135 bpm. Regular rhythm.  Movement: Unremarkable.  Placenta: Anterior. No evidence for placenta previa.  Adnexa: Unremarkable.   Cervical length: 3 cm.   Amniotic fluid: " Subjectively normal.      Other findings: The four-chamber heart, stomach, bladder, and kidney  views are unremarkable where seen.  A complete anatomy scan was not performed.     Measured parameters:       BPD:  4.4 cm      Age: 19 weeks 4 days.       HC:    16.3 cm      Age: 19 weeks 1 day.       AC:  13.8 cm      Age: 19 weeks 2 days.       FL:   3.0 cm      Age: 19 weeks 2 days.    Gestational age by current ultrasound measurement: 19 weeks 3 days,  corresponding to an JEMIMA of 11/25/2020.    Estimated fetal weight: 282 grams.       Impression    IMPRESSION:    1. Single viable intrauterine pregnancy of 19 weeks 3 days gestation  by current ultrasound measurement.   2. Otherwise unremarkable limited obstetric ultrasound. No cause for  abdominal pain is identified.    BASSAM VIZCARRA MD   US Appendix Only     Status: None    Narrative    US APPENDIX ONLY   7/4/2020 7:57 PM     HISTORY: Right-sided abdominal pain at 19 weeks gestation.    COMPARISON: None.    FINDINGS: The appendix is not visualized, and therefore appendicitis  cannot be excluded. No right lower quadrant masses or fluid  collections are identified. No other sonographic abnormalities are  seen in the right lower quadrant. Incidentally noted unremarkable  right kidney, with no evidence for hydronephrosis.      Impression    IMPRESSION: Nonvisualization of the appendix. Appendicitis cannot be  excluded on the basis of this exam.    BASSAM VIZCARRA MD   UA with Microscopic reflex to Culture     Status: Abnormal    Specimen: Urine clean catch; Midstream Urine   Result Value Ref Range    Color Urine Yellow     Appearance Urine Cloudy     Glucose Urine Negative NEG^Negative mg/dL    Bilirubin Urine Negative NEG^Negative    Ketones Urine 80 (A) NEG^Negative mg/dL    Specific Gravity Urine 1.016 1.003 - 1.035    Blood Urine Negative NEG^Negative    pH Urine 7.0 5.0 - 7.0 pH    Protein Albumin Urine Negative NEG^Negative mg/dL    Urobilinogen mg/dL 0.0 0.0 -  2.0 mg/dL    Nitrite Urine Negative NEG^Negative    Leukocyte Esterase Urine Negative NEG^Negative    Source Midstream Urine     WBC Urine 3 0 - 5 /HPF    RBC Urine 0 0 - 2 /HPF    Bacteria Urine Few (A) NEG^Negative /HPF    Squamous Epithelial /HPF Urine 5 (H) 0 - 1 /HPF    Amorphous Crystals Few (A) NEG^Negative /HPF   Drug abuse screen urine     Status: Abnormal   Result Value Ref Range    Amphetamine Qual Urine Positive (A) NEG^Negative    Barbiturates Qual Urine Negative NEG^Negative    Benzodiazepine Qual Urine Negative NEG^Negative    Cannabinoids Qual Urine Positive (A) NEG^Negative    Cocaine Qual Urine Negative NEG^Negative    Opiates Qualitative Urine Positive (A) NEG^Negative    PCP Qual Urine Negative NEG^Negative   INR     Status: None   Result Value Ref Range    INR 1.05 0.86 - 1.14   Partial thromboplastin time     Status: None   Result Value Ref Range    PTT 26 22 - 37 sec   Fibrinogen activity     Status: Abnormal   Result Value Ref Range    Fibrinogen 456 (H) 200 - 420 mg/dL   CBC with platelets     Status: Abnormal   Result Value Ref Range    WBC 10.6 4.0 - 11.0 10e9/L    RBC Count 3.73 (L) 3.8 - 5.2 10e12/L    Hemoglobin 11.0 (L) 11.7 - 15.7 g/dL    Hematocrit 32.6 (L) 35.0 - 47.0 %    MCV 87 78 - 100 fl    MCH 29.5 26.5 - 33.0 pg    MCHC 33.7 31.5 - 36.5 g/dL    RDW 13.7 10.0 - 15.0 %    Platelet Count 233 150 - 450 10e9/L   AST     Status: None   Result Value Ref Range    AST 11 0 - 45 U/L   ALT     Status: None   Result Value Ref Range    ALT 14 0 - 50 U/L   Basic metabolic panel     Status: Abnormal   Result Value Ref Range    Sodium 136 133 - 144 mmol/L    Potassium 3.1 (L) 3.4 - 5.3 mmol/L    Chloride 105 94 - 109 mmol/L    Carbon Dioxide 24 20 - 32 mmol/L    Anion Gap 7 3 - 14 mmol/L    Glucose 109 (H) 70 - 99 mg/dL    Urea Nitrogen 12 7 - 30 mg/dL    Creatinine 0.55 0.52 - 1.04 mg/dL    GFR Estimate >90 >60 mL/min/[1.73_m2]    GFR Estimate If Black >90 >60 mL/min/[1.73_m2]    Calcium 8.6  8.5 - 10.1 mg/dL   Uric acid     Status: None   Result Value Ref Range    Uric Acid 3.1 2.6 - 6.0 mg/dL   Amylase     Status: None   Result Value Ref Range    Amylase 41 30 - 110 U/L   Lipase     Status: Abnormal   Result Value Ref Range    Lipase 63 (L) 73 - 393 U/L   WBC and differential     Status: None   Result Value Ref Range    WBC 10.5 4.0 - 11.0 10e9/L    Diff Method Automated Method     % Neutrophils 72.9 %    % Lymphocytes 20.0 %    % Monocytes 5.9 %    % Eosinophils 0.3 %    % Basophils 0.4 %    % Immature Granulocytes 0.5 %    Nucleated RBCs 0 0 /100    Absolute Neutrophil 7.7 1.6 - 8.3 10e9/L    Absolute Lymphocytes 2.1 0.8 - 5.3 10e9/L    Absolute Monocytes 0.6 0.0 - 1.3 10e9/L    Absolute Eosinophils 0.0 0.0 - 0.7 10e9/L    Absolute Basophils 0.0 0.0 - 0.2 10e9/L    Abs Immature Granulocytes 0.1 0 - 0.4 10e9/L    Absolute Nucleated RBC 0.0    ABO/Rh type and screen     Status: None   Result Value Ref Range    ABO A     RH(D) Pos     Antibody Screen Neg     Test Valid Only At [facility]        Specimen Expires 2020                    Results for orders placed or performed during the hospital encounter of 20 (from the past 24 hour(s))   Urine Culture Aerobic Bacterial    Specimen: Midstream Urine   Result Value Ref Range    Specimen Description Midstream Urine     Special Requests Specimen received in preservative     Culture Micro PENDING        Medications - No data to display    Assessments & Plan (with Medical Decision Making)  19-week -0-0-2 abdominal pain details per HPI.  Stabbing sharp pain settled with 2 doses hydromorphone upstairs.  Mild right mid lateral abdominal tenderness.  No rebound no guarding.  Lab work is reviewed from upstairs and is unremarkable with exception of urine tox screen positive for amphetamine, THC and opiates.  Discussed amphetamine patient denies use, discussed THC patient reports intermittent marijuana use, denies outpatient opiate use,  likely positive secondary to hydromorphone.  Recommend no drug use during pregnancy.  Believe abdominal pain likely secondary to muscular abdominal wall strain in the context [place], she is also dehydrated which may have contributed to some muscular irritability.  There is no indication for further evaluation, she did have ultrasound upstairs appendix was not visualized although tenderness more lateral and high then would expect for appendicitis, afebrile, no elevated white count and pain came on abruptly and suddenly character which is not consistent with appendicitis.  Her urine is clear, ultrasound right kidney no hydronephrosis, no history of kidney stone.  No evidence for urinary tract infection.  Clinically no evidence for bowel obstruction.  Reportedly not in labor per OB evaluation.  No indication for further evaluation at this time.  Recommend rest, drink plenty of fluids, Tylenol ibuprofen for discomfort, return/follow-up criteria reviewed.     I have reviewed the nursing notes.    I have reviewed the findings, diagnosis, plan and need for follow up with the patient.       Discharge Medication List as of 7/4/2020 10:41 PM          Final diagnoses:   Abdominal pain, right lower quadrant   Dehydration       7/4/2020   [facility] EMERGENCY DEPARTMENT     Jono Tuttle MD  07/05/20 [id number]

## 2020-07-05 NOTE — H&P
[facility] Labor and Delivery History and Physical    Zoe Nielsen MRN# [id number]   Age: 40 year old YOB: 1980     Date of Admission:  2020    Primary care provider: [facility] (planned)           Chief Complaint:   Zoe Nielsen is a 40 year old female who is ~19-20 weeks pregnant and presented with diffuse abdominal pain.  Pain started this afternoon fairly suddenly.  Denies urinary symptoms.  Had normal BM this morning.  Pain is constant and severe: stabbing burning aching (unable to articulate a more specific description).      No prior hx of kidney stones or gallstones.  No other chronic medical issues.     Occasional fetal movement.  No vaginal bleeding.  No prenatal care yet this pregnancy except positive pregnancy test early at [facility].            Pregnancy history:     OBSTETRIC HISTORY:    OB History    Para Term  AB Living   1 0 0 0 0 0   SAB TAB Ectopic Multiple Live Births   0 0 0 0 0      # Outcome Date GA Lbr Demetrius/2nd Weight Sex Delivery Anes PTL Lv   1 Current                Prenatal Labs:   Lab Results   Component Value Date    ABO PENDING 2020    AS PENDING 2020    HGB 11.0 (L) 2020       Active Problem List  Patient Active Problem List   Diagnosis     Supervision of other normal pregnancy       Medication Prior to Admission  Medications Prior to Admission   Medication Sig Dispense Refill Last Dose     fish oil-omega-3 fatty acids 1000 MG capsule Take 2 g by mouth daily      .        Maternal Past Medical History:     Past Medical History:   Diagnosis Date     Tooth fracture                        Family History:   This patient has no significant family history            Social History:     Social History     Tobacco Use     Smoking status: Light Tobacco Smoker     Types: Cigarettes   Substance Use Topics     Alcohol use: Not Currently            Review of Systems:   The Review of Systems is negative other than noted  in the HPI          Physical Exam:   Vitals were reviewed  Temp: 97.9  F (36.6  C) Temp src: Oral BP: 136/81   Heart Rate: 68 Resp: 16        Constitutional:   Awake, writhing in pain     Back:   Mild to moderate right CVAT, no left CVAT     Abd: soft, diffusely tender, no rebound or guarding   Cervix: deferred                 Component      Latest Ref Rng & Units 7/4/2020   Sodium      133 - 144 mmol/L 136   Potassium      3.4 - 5.3 mmol/L 3.1 (L)   Chloride      94 - 109 mmol/L 105   Carbon Dioxide      20 - 32 mmol/L 24   Anion Gap      3 - 14 mmol/L 7   Glucose      70 - 99 mg/dL 109 (H)   Urea Nitrogen      7 - 30 mg/dL 12   Creatinine      0.52 - 1.04 mg/dL 0.55   GFR Estimate      >60 mL/min/1.73:m2 >90   GFR Estimate If Black      >60 mL/min/1.73:m2 >90   Calcium      8.5 - 10.1 mg/dL 8.6   WBC      4.0 - 11.0 10e9/L 10.6   RBC Count      3.8 - 5.2 10e12/L 3.73 (L)   Hemoglobin      11.7 - 15.7 g/dL 11.0 (L)   Hematocrit      35.0 - 47.0 % 32.6 (L)   MCV      78 - 100 fl 87   MCH      26.5 - 33.0 pg 29.5   MCHC      31.5 - 36.5 g/dL 33.7   RDW      10.0 - 15.0 % 13.7   Platelet Count      150 - 450 10e9/L 233   INR      0.86 - 1.14 1.05   PTT      22 - 37 sec 26   Fibrinogen      200 - 420 mg/dL 456 (H)   AST      0 - 45 U/L 11   ALT      0 - 50 U/L 14   Uric Acid      2.6 - 6.0 mg/dL 3.1       U/S per tech: unable to visualize appendix, normal right kidney.  ~ GA 19.3 weeks.  EDC: 11/25/2020 by today's ultrasound.        Assessment:   Zoe Nielsen is here with abdominal pain in pregnancy.          Plan:   No obvious etiology for pain, but does not appear obstetric.  Ddx includes kidney stone, gallstones, appendicitis, pancreatitis.  Will likely need CT scan, but since her pain is most likely unrelated to her pregnancy, will send back to ED for further evaluation.      ED staff informed of current work up and recommendations.     Jackie Cabral MD

## 2020-07-06 LAB
BACTERIA SPEC CULT: NO GROWTH
Lab: NORMAL
SPECIMEN SOURCE: NORMAL

## 2020-07-06 NOTE — RESULT ENCOUNTER NOTE
Final urine culture report is NEGATIVE per [place] [facility] Result protocol.    If NEGATIVE result, no change in treatment, per [place] [facility] Lab Result protocol.

## 2021-05-30 ENCOUNTER — RECORDS - HEALTHEAST (OUTPATIENT)
Dept: ADMINISTRATIVE | Facility: CLINIC | Age: 41
End: 2021-05-30

## (undated) DEVICE — GLOVE PROTEXIS W/NEU-THERA 6.5  2D73TE65

## (undated) DEVICE — SYR BULB IRRIG DOVER 60 ML LATEX FREE 67000

## (undated) DEVICE — NDL 19GA 1.5"

## (undated) DEVICE — STPL SKIN 35W 054887

## (undated) DEVICE — DRAPE SHEET HALF 40X60" 9358

## (undated) DEVICE — SUCTION TUBING 10' N1010

## (undated) DEVICE — GLOVE PROTEXIS W/NEU-THERA 7.5  2D73TE75

## (undated) DEVICE — GLOVE PROTEXIS BLUE W/NEU-THERA 7.0  2D73EB70

## (undated) DEVICE — ESU ELEC BLADE 6" COATED/INSULATED E1455-6

## (undated) DEVICE — DRSG TEGADERM 2 3/8X2 3/4" 1624W

## (undated) DEVICE — ADH LIQUID MASTISOL TOPICAL VIAL 2-3ML 0523-48

## (undated) DEVICE — MARKER SKIN DOUBLE TIP W/FLEXI-RULER W/LABELS

## (undated) DEVICE — DRSG STERI STRIP 1/2X4" R1547

## (undated) DEVICE — DECANTER TRANSFER DEVICE 2008S

## (undated) DEVICE — SU PROLENE 4-0 PS-2 18" 8682G

## (undated) DEVICE — SU MONOCRYL 4-0 P-3 18" UND Y494G

## (undated) DEVICE — SU VICRYL 3-0 SH 27" J316H

## (undated) DEVICE — SU PROLENE 6-0 P-1 18" 8697G

## (undated) DEVICE — SYR 50ML LL W/O NDL 309653

## (undated) DEVICE — PREP CHLORAPREP 26ML TINTED ORANGE  260815

## (undated) DEVICE — NDL SPINAL 25GA 3.5" QUINCKE 405180

## (undated) DEVICE — DRAPE SHEET 3/4 78X60"

## (undated) DEVICE — SUCTION TIP YANKAUER W/O VENT K86

## (undated) DEVICE — DRSG ABDOMINAL 07 1/2X8" 7197D

## (undated) DEVICE — PACK MINOR SBA15MIFSE

## (undated) DEVICE — DRSG KERLIX FLUFFS X5

## (undated) DEVICE — SOL WATER IRRIG 1000ML BOTTLE 07139-09

## (undated) DEVICE — SUCTION CANISTER MEDIVAC LINER 1500ML W/LID 65651-515

## (undated) DEVICE — DRAPE BREAST/CHEST 29420

## (undated) RX ORDER — ACETAMINOPHEN 325 MG/1
TABLET ORAL
Status: DISPENSED
Start: 2019-03-06

## (undated) RX ORDER — FENTANYL CITRATE 50 UG/ML
INJECTION, SOLUTION INTRAMUSCULAR; INTRAVENOUS
Status: DISPENSED
Start: 2019-03-06

## (undated) RX ORDER — NEOSTIGMINE METHYLSULFATE 1 MG/ML
VIAL (ML) INJECTION
Status: DISPENSED
Start: 2019-03-06

## (undated) RX ORDER — GENTAMICIN 40 MG/ML
INJECTION, SOLUTION INTRAMUSCULAR; INTRAVENOUS
Status: DISPENSED
Start: 2019-03-06

## (undated) RX ORDER — BUPIVACAINE HYDROCHLORIDE 2.5 MG/ML
INJECTION, SOLUTION INFILTRATION; PERINEURAL
Status: DISPENSED
Start: 2019-03-06

## (undated) RX ORDER — ONDANSETRON 2 MG/ML
INJECTION INTRAMUSCULAR; INTRAVENOUS
Status: DISPENSED
Start: 2019-03-06

## (undated) RX ORDER — EPINEPHRINE 1 MG/ML
INJECTION, SOLUTION INTRAMUSCULAR; SUBCUTANEOUS
Status: DISPENSED
Start: 2019-03-06

## (undated) RX ORDER — CEFAZOLIN SODIUM 2 G/100ML
INJECTION, SOLUTION INTRAVENOUS
Status: DISPENSED
Start: 2019-03-06

## (undated) RX ORDER — GABAPENTIN 300 MG/1
CAPSULE ORAL
Status: DISPENSED
Start: 2019-03-06

## (undated) RX ORDER — CEFAZOLIN SODIUM 1 G/3ML
INJECTION, POWDER, FOR SOLUTION INTRAMUSCULAR; INTRAVENOUS
Status: DISPENSED
Start: 2019-03-06

## (undated) RX ORDER — OXYCODONE HYDROCHLORIDE 5 MG/1
TABLET ORAL
Status: DISPENSED
Start: 2019-03-06

## (undated) RX ORDER — DEXAMETHASONE SODIUM PHOSPHATE 4 MG/ML
INJECTION, SOLUTION INTRA-ARTICULAR; INTRALESIONAL; INTRAMUSCULAR; INTRAVENOUS; SOFT TISSUE
Status: DISPENSED
Start: 2019-03-06

## (undated) RX ORDER — GLYCOPYRROLATE 0.2 MG/ML
INJECTION INTRAMUSCULAR; INTRAVENOUS
Status: DISPENSED
Start: 2019-03-06

## (undated) RX ORDER — TRIAMCINOLONE ACETONIDE 40 MG/ML
INJECTION, SUSPENSION INTRA-ARTICULAR; INTRAMUSCULAR
Status: DISPENSED
Start: 2019-03-06